# Patient Record
Sex: MALE | Race: WHITE
[De-identification: names, ages, dates, MRNs, and addresses within clinical notes are randomized per-mention and may not be internally consistent; named-entity substitution may affect disease eponyms.]

---

## 2019-04-02 ENCOUNTER — HOSPITAL ENCOUNTER (OUTPATIENT)
Dept: HOSPITAL 53 - M LABDRAW1 | Age: 39
End: 2019-04-02
Attending: PHYSICIAN ASSISTANT
Payer: COMMERCIAL

## 2019-04-02 DIAGNOSIS — I10: Primary | ICD-10-CM

## 2019-04-02 LAB
ALBUMIN SERPL BCG-MCNC: 4.5 GM/DL (ref 3.2–5.2)
ALT SERPL W P-5'-P-CCNC: 67 U/L (ref 12–78)
BASOPHILS # BLD AUTO: 0 10^3/UL (ref 0–0.2)
BASOPHILS NFR BLD AUTO: 0.5 % (ref 0–1)
BILIRUB SERPL-MCNC: 0.7 MG/DL (ref 0.2–1)
BUN SERPL-MCNC: 14 MG/DL (ref 7–18)
CALCIUM SERPL-MCNC: 9 MG/DL (ref 8.5–10.1)
CHLORIDE SERPL-SCNC: 102 MEQ/L (ref 98–107)
CHOLEST SERPL-MCNC: 174 MG/DL (ref ?–200)
CHOLEST/HDLC SERPL: 4.05 {RATIO} (ref ?–5)
CO2 SERPL-SCNC: 27 MEQ/L (ref 21–32)
CREAT SERPL-MCNC: 1.06 MG/DL (ref 0.7–1.3)
EOSINOPHIL # BLD AUTO: 0.1 10^3/UL (ref 0–0.5)
EOSINOPHIL NFR BLD AUTO: 1.2 % (ref 0–3)
EST. AVERAGE GLUCOSE BLD GHB EST-MCNC: 123 MG/DL (ref 60–110)
GFR SERPL CREATININE-BSD FRML MDRD: > 60 ML/MIN/{1.73_M2} (ref 60–?)
GLUCOSE SERPL-MCNC: 92 MG/DL (ref 70–100)
HCT VFR BLD AUTO: 45 % (ref 42–52)
HDLC SERPL-MCNC: 43 MG/DL (ref 40–?)
HGB BLD-MCNC: 15 G/DL (ref 13.5–17.5)
LDLC SERPL CALC-MCNC: 104 MG/DL (ref ?–100)
LYMPHOCYTES # BLD AUTO: 1.3 10^3/UL (ref 1.5–4.5)
LYMPHOCYTES NFR BLD AUTO: 19.1 % (ref 24–44)
MCH RBC QN AUTO: 29.3 PG (ref 27–33)
MCHC RBC AUTO-ENTMCNC: 33.3 G/DL (ref 32–36.5)
MCV RBC AUTO: 87.9 FL (ref 80–96)
MONOCYTES # BLD AUTO: 0.6 10^3/UL (ref 0–0.8)
MONOCYTES NFR BLD AUTO: 8.7 % (ref 0–5)
NEUTROPHILS # BLD AUTO: 4.6 10^3/UL (ref 1.8–7.7)
NEUTROPHILS NFR BLD AUTO: 69.9 % (ref 36–66)
NONHDLC SERPL-MCNC: 131 MG/DL
PLATELET # BLD AUTO: 281 10^3/UL (ref 150–450)
POTASSIUM SERPL-SCNC: 4.4 MEQ/L (ref 3.5–5.1)
PROT SERPL-MCNC: 7.8 GM/DL (ref 6.4–8.2)
RBC # BLD AUTO: 5.12 10^6/UL (ref 4.3–6.1)
SODIUM SERPL-SCNC: 135 MEQ/L (ref 136–145)
T4 FREE SERPL-MCNC: 1.11 NG/DL (ref 0.76–1.46)
TRIGL SERPL-MCNC: 137 MG/DL (ref ?–150)
TSH SERPL DL<=0.005 MIU/L-ACNC: 5.54 UIU/ML (ref 0.36–3.74)
WBC # BLD AUTO: 6.6 10^3/UL (ref 4–10)

## 2019-10-11 ENCOUNTER — HOSPITAL ENCOUNTER (EMERGENCY)
Dept: HOSPITAL 53 - M ED | Age: 39
Discharge: HOME | End: 2019-10-11
Payer: COMMERCIAL

## 2019-10-11 VITALS — WEIGHT: 221.34 LBS | BODY MASS INDEX: 35.57 KG/M2 | HEIGHT: 66 IN

## 2019-10-11 VITALS — DIASTOLIC BLOOD PRESSURE: 87 MMHG | SYSTOLIC BLOOD PRESSURE: 140 MMHG
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
## 2019-10-11 DIAGNOSIS — Z77.21: ICD-10-CM

## 2019-10-11 DIAGNOSIS — S61.031A: Primary | ICD-10-CM
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
## 2019-10-11 LAB
ALBUMIN SERPL BCG-MCNC: 4.1 GM/DL (ref 3.2–5.2)
ALT SERPL W P-5'-P-CCNC: 91 U/L (ref 12–78)
BASOPHILS # BLD AUTO: 0 10^3/UL (ref 0–0.2)
BASOPHILS NFR BLD AUTO: 0.7 % (ref 0–1)
BILIRUB SERPL-MCNC: 0.7 MG/DL (ref 0.2–1)
BUN SERPL-MCNC: 13 MG/DL (ref 7–18)
CALCIUM SERPL-MCNC: 8.7 MG/DL (ref 8.5–10.1)
CHLORIDE SERPL-SCNC: 104 MEQ/L (ref 98–107)
CO2 SERPL-SCNC: 27 MEQ/L (ref 21–32)
CREAT SERPL-MCNC: 1.02 MG/DL (ref 0.7–1.3)
EOSINOPHIL # BLD AUTO: 0.1 10^3/UL (ref 0–0.5)
EOSINOPHIL NFR BLD AUTO: 1.4 % (ref 0–3)
GFR SERPL CREATININE-BSD FRML MDRD: > 60 ML/MIN/{1.73_M2} (ref 60–?)
GLUCOSE SERPL-MCNC: 93 MG/DL (ref 70–100)
HBV SURFACE AB SER QL: POSITIVE
HBV SURFACE AB SER-ACNC: NEGATIVE M[IU]/ML
HCT VFR BLD AUTO: 43.4 % (ref 42–52)
HCV AB SER QL: 0.2 INDEX (ref ?–0.8)
HGB BLD-MCNC: 15 G/DL (ref 13.5–17.5)
HIV 1+2 AB+HIV1 P24 AG SERPL QL IA: NEGATIVE
LYMPHOCYTES # BLD AUTO: 1.1 10^3/UL (ref 1.5–5)
LYMPHOCYTES NFR BLD AUTO: 19.7 % (ref 24–44)
MCH RBC QN AUTO: 30.5 PG (ref 27–33)
MCHC RBC AUTO-ENTMCNC: 34.6 G/DL (ref 32–36.5)
MCV RBC AUTO: 88.2 FL (ref 80–96)
MONOCYTES # BLD AUTO: 0.5 10^3/UL (ref 0–0.8)
MONOCYTES NFR BLD AUTO: 8.7 % (ref 0–5)
NEUTROPHILS # BLD AUTO: 4 10^3/UL (ref 1.5–8.5)
NEUTROPHILS NFR BLD AUTO: 69 % (ref 36–66)
PLATELET # BLD AUTO: 250 10^3/UL (ref 150–450)
POTASSIUM SERPL-SCNC: 4.3 MEQ/L (ref 3.5–5.1)
PROT SERPL-MCNC: 7.3 GM/DL (ref 6.4–8.2)
RBC # BLD AUTO: 4.92 10^6/UL (ref 4.3–6.1)
SODIUM SERPL-SCNC: 140 MEQ/L (ref 136–145)
WBC # BLD AUTO: 5.8 10^3/UL (ref 4–10)

## 2019-10-15 ENCOUNTER — HOSPITAL ENCOUNTER (EMERGENCY)
Dept: HOSPITAL 53 - M ED | Age: 39
Discharge: HOME | End: 2019-10-15
Payer: COMMERCIAL

## 2019-10-15 VITALS — DIASTOLIC BLOOD PRESSURE: 102 MMHG | SYSTOLIC BLOOD PRESSURE: 175 MMHG

## 2019-10-15 VITALS — HEIGHT: 66 IN | BODY MASS INDEX: 35.22 KG/M2 | WEIGHT: 219.14 LBS
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
## 2019-10-15 DIAGNOSIS — S61.442A: Primary | ICD-10-CM

## 2019-10-15 NOTE — REP
Left hand series:  Two views.

 

History:  Assess for foreign body.  Needle injury base of the long finger.

 

Findings:  AP and lateral views demonstrate a metallic ring over the proximal

phalanx of the ring finger on the AP view.  This was removed for the lateral

view.  No opaque foreign body is appreciated.  Overall mineralization pattern is

normal.  No fractures seen.  No soft tissue gas is seen.

 

Impression:

 

No opaque foreign body seen.

 

 

Electronically Signed by

Abner Pickard MD 10/15/2019 11:27 A

## 2019-11-12 ENCOUNTER — HOSPITAL ENCOUNTER (OUTPATIENT)
Dept: HOSPITAL 53 - M SFHCPLAZ | Age: 39
End: 2019-11-12
Attending: INTERNAL MEDICINE
Payer: COMMERCIAL

## 2019-11-12 DIAGNOSIS — Z13.9: Primary | ICD-10-CM

## 2019-11-12 LAB
ALBUMIN SERPL BCG-MCNC: 4.2 GM/DL (ref 3.2–5.2)
ALT SERPL W P-5'-P-CCNC: 110 U/L (ref 12–78)
BILIRUB SERPL-MCNC: 0.8 MG/DL (ref 0.2–1)
BUN SERPL-MCNC: 15 MG/DL (ref 7–18)
CALCIUM SERPL-MCNC: 8.8 MG/DL (ref 8.5–10.1)
CHLORIDE SERPL-SCNC: 102 MEQ/L (ref 98–107)
CO2 SERPL-SCNC: 28 MEQ/L (ref 21–32)
CREAT SERPL-MCNC: 1.14 MG/DL (ref 0.7–1.3)
GFR SERPL CREATININE-BSD FRML MDRD: > 60 ML/MIN/{1.73_M2} (ref 60–?)
GLUCOSE SERPL-MCNC: 108 MG/DL (ref 70–100)
HCT VFR BLD AUTO: 46.9 % (ref 42–52)
HGB BLD-MCNC: 15.6 G/DL (ref 13.5–17.5)
MCH RBC QN AUTO: 29.7 PG (ref 27–33)
MCHC RBC AUTO-ENTMCNC: 33.3 G/DL (ref 32–36.5)
MCV RBC AUTO: 89.2 FL (ref 80–96)
PLATELET # BLD AUTO: 259 10^3/UL (ref 150–450)
POTASSIUM SERPL-SCNC: 3.9 MEQ/L (ref 3.5–5.1)
PROT SERPL-MCNC: 7.7 GM/DL (ref 6.4–8.2)
RBC # BLD AUTO: 5.26 10^6/UL (ref 4.3–6.1)
SODIUM SERPL-SCNC: 137 MEQ/L (ref 136–145)
WBC # BLD AUTO: 6.6 10^3/UL (ref 4–10)

## 2019-11-13 LAB
HCV AB SER QL: 0.1 INDEX (ref ?–0.8)
HIV 1+2 AB+HIV1 P24 AG SERPL QL IA: NEGATIVE

## 2020-01-14 ENCOUNTER — HOSPITAL ENCOUNTER (OUTPATIENT)
Dept: HOSPITAL 53 - M PLALAB | Age: 40
End: 2020-01-14
Attending: INTERNAL MEDICINE
Payer: COMMERCIAL
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
## 2020-01-14 DIAGNOSIS — S61.239A: Primary | ICD-10-CM
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
## 2020-01-14 LAB
ALBUMIN SERPL BCG-MCNC: 4.2 GM/DL (ref 3.2–5.2)
ALT SERPL W P-5'-P-CCNC: 108 U/L (ref 12–78)
BILIRUB SERPL-MCNC: 0.5 MG/DL (ref 0.2–1)
BUN SERPL-MCNC: 13 MG/DL (ref 7–18)
CALCIUM SERPL-MCNC: 8.4 MG/DL (ref 8.5–10.1)
CHLORIDE SERPL-SCNC: 104 MEQ/L (ref 98–107)
CO2 SERPL-SCNC: 25 MEQ/L (ref 21–32)
CREAT SERPL-MCNC: 1.01 MG/DL (ref 0.7–1.3)
GFR SERPL CREATININE-BSD FRML MDRD: > 60 ML/MIN/{1.73_M2} (ref 60–?)
GLUCOSE SERPL-MCNC: 105 MG/DL (ref 70–100)
POTASSIUM SERPL-SCNC: 4.4 MEQ/L (ref 3.5–5.1)
PROT SERPL-MCNC: 7.2 GM/DL (ref 6.4–8.2)
SODIUM SERPL-SCNC: 136 MEQ/L (ref 136–145)

## 2020-01-15 LAB
HCV AB SER QL: < 0 INDEX (ref ?–0.8)
HIV 1+2 AB+HIV1 P24 AG SERPL QL IA: NEGATIVE